# Patient Record
Sex: FEMALE | Race: WHITE | URBAN - METROPOLITAN AREA
[De-identification: names, ages, dates, MRNs, and addresses within clinical notes are randomized per-mention and may not be internally consistent; named-entity substitution may affect disease eponyms.]

---

## 2017-02-01 ENCOUNTER — TELEPHONE (OUTPATIENT)
Dept: OPHTHALMOLOGY | Facility: CLINIC | Age: 62
End: 2017-02-01

## 2017-02-01 NOTE — TELEPHONE ENCOUNTER
I have called Mrs. Alcantar to discuss with her the results of the MRI that she had recently.   I have reviewed the MRI personally and did not see an evidence of inflammation or pressure on the nerves.   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 We discussed the risk factors.  Will see her again as scheduled.     Maribell Montgomery MD  Neuro-Ophthalmology Fellow

## 2017-06-01 DIAGNOSIS — H53.10 SUBJECTIVE VISUAL DISTURBANCE: Primary | ICD-10-CM

## 2021-05-13 ENCOUNTER — RECORDS - HEALTHEAST (OUTPATIENT)
Dept: ADMINISTRATIVE | Facility: OTHER | Age: 66
End: 2021-05-13

## 2021-05-13 ENCOUNTER — RECORDS - HEALTHEAST (OUTPATIENT)
Dept: PHYSICAL MEDICINE AND REHAB | Facility: CLINIC | Age: 66
End: 2021-05-13

## 2021-05-13 DIAGNOSIS — M54.2 NECK PAIN: ICD-10-CM

## 2021-05-13 DIAGNOSIS — M54.2 CERVICAL PAIN: ICD-10-CM

## 2021-05-29 ENCOUNTER — RECORDS - HEALTHEAST (OUTPATIENT)
Dept: ADMINISTRATIVE | Facility: CLINIC | Age: 66
End: 2021-05-29

## 2021-06-01 ENCOUNTER — RECORDS - HEALTHEAST (OUTPATIENT)
Dept: ADMINISTRATIVE | Facility: CLINIC | Age: 66
End: 2021-06-01

## 2021-09-14 ENCOUNTER — OFFICE VISIT (OUTPATIENT)
Dept: PHYSICAL MEDICINE AND REHAB | Facility: CLINIC | Age: 66
End: 2021-09-14
Payer: MEDICARE

## 2021-09-14 DIAGNOSIS — M79.622 PAIN OF LEFT UPPER ARM: Primary | ICD-10-CM

## 2021-09-14 PROCEDURE — 95886 MUSC TEST DONE W/N TEST COMP: CPT | Mod: LT | Performed by: PHYSICAL MEDICINE & REHABILITATION

## 2021-09-14 PROCEDURE — 95910 NRV CNDJ TEST 7-8 STUDIES: CPT | Performed by: PHYSICAL MEDICINE & REHABILITATION

## 2021-09-14 NOTE — PATIENT INSTRUCTIONS
Thank you for choosing the [facility] for your EMG testing.    The ordering provider will receive your final EMG results within the next few days.  Please follow up with your provider for the results and further treatment recommendations.

## 2021-09-14 NOTE — LETTER
9/14/2021         RE: Arabella Alcantar  [address] [id number]        Dear Colleague,    Thank you for referring your patient, Arabella Alcantar, to the [facility]. Please see a copy of my visit note below.    The patient presents at the request of Dr. German Brown for a left upper extremity EMG.  She has cervical spine pain with left shoulder pain left arm pain to the elbow.  Reports having frozen shoulder on the left.  She is right-handed.  No numbness or tingling in left upper extremity.  She has normal sensation to light touch throughout the upper extremities, normal/symmetric reflexes with negative Everette's and normal strength of the elbow flexors/extensors, wrist extensors interosseous and finger flexors.  She has decreased range of motion left shoulder in abduction internal and external rotation.    EMG/NCS  results: Please see scanned full report    Comment NCS: Normal study  1.  Normal nerve conduction studies left upper extremity.    Comment EMG: Normal study  1.  Normal needle EMG left upper extremity.    Interpretation: Normal study    1. There is no electrodiagnostic evidence of cervical radiculopathy, brachial plexopathy, or focal neuropathy in the left upper extremity.    The testing was completed in its entirety by the physician.       It was our pleasure caring for your patient today, if there any questions or concerns please do not hesitate to contact us.        Again, thank you for allowing me to participate in the care of your patient.        Sincerely,        Rafael Christianson, DO

## 2021-09-14 NOTE — PROGRESS NOTES
The patient presents at the request of Dr. German Brown for a left upper extremity EMG.  She has cervical spine pain with left shoulder pain left arm pain to the elbow.  Reports having frozen shoulder on the left.  She is right-handed.  No numbness or tingling in left upper extremity.  She has normal sensation to light touch throughout the upper extremities, normal/symmetric reflexes with negative Everette's and normal strength of the elbow flexors/extensors, wrist extensors interosseous and finger flexors.  She has decreased range of motion left shoulder in abduction internal and external rotation.    EMG/NCS  results: Please see scanned full report    Comment NCS: Normal study  1.  Normal nerve conduction studies left upper extremity.    Comment EMG: Normal study  1.  Normal needle EMG left upper extremity.    Interpretation: Normal study    1. There is no electrodiagnostic evidence of cervical radiculopathy, brachial plexopathy, or focal neuropathy in the left upper extremity.    The testing was completed in its entirety by the physician.       It was our pleasure caring for your patient today, if there any questions or concerns please do not hesitate to contact us.